# Patient Record
Sex: FEMALE | Race: WHITE | Employment: FULL TIME | URBAN - METROPOLITAN AREA
[De-identification: names, ages, dates, MRNs, and addresses within clinical notes are randomized per-mention and may not be internally consistent; named-entity substitution may affect disease eponyms.]

---
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
## 2024-02-28 DIAGNOSIS — F90.0 ATTENTION DEFICIT HYPERACTIVITY DISORDER (ADHD), PREDOMINANTLY INATTENTIVE TYPE: ICD-10-CM

## 2024-02-28 RX ORDER — DEXTROAMPHETAMINE SACCHARATE, AMPHETAMINE ASPARTATE, DEXTROAMPHETAMINE SULFATE AND AMPHETAMINE SULFATE 7.5; 7.5; 7.5; 7.5 MG/1; MG/1; MG/1; MG/1
30 TABLET ORAL DAILY
Qty: 30 TABLET | Refills: 0 | Status: SHIPPED | OUTPATIENT
Start: 2024-02-28 | End: 2024-03-29

## 2024-04-24 DIAGNOSIS — F90.0 ATTENTION DEFICIT HYPERACTIVITY DISORDER (ADHD), PREDOMINANTLY INATTENTIVE TYPE: ICD-10-CM

## 2024-04-24 RX ORDER — DEXTROAMPHETAMINE SACCHARATE, AMPHETAMINE ASPARTATE, DEXTROAMPHETAMINE SULFATE AND AMPHETAMINE SULFATE 7.5; 7.5; 7.5; 7.5 MG/1; MG/1; MG/1; MG/1
30 TABLET ORAL DAILY
Qty: 30 TABLET | Refills: 0 | Status: SHIPPED | OUTPATIENT
Start: 2024-04-24 | End: 2024-05-24

## 2024-07-25 DIAGNOSIS — F90.0 ATTENTION DEFICIT HYPERACTIVITY DISORDER (ADHD), PREDOMINANTLY INATTENTIVE TYPE: ICD-10-CM

## 2024-07-25 RX ORDER — DEXTROAMPHETAMINE SACCHARATE, AMPHETAMINE ASPARTATE, DEXTROAMPHETAMINE SULFATE AND AMPHETAMINE SULFATE 7.5; 7.5; 7.5; 7.5 MG/1; MG/1; MG/1; MG/1
30 TABLET ORAL DAILY
Qty: 30 TABLET | Refills: 0 | Status: SHIPPED | OUTPATIENT
Start: 2024-07-25 | End: 2024-08-24

## 2024-07-25 NOTE — TELEPHONE ENCOUNTER
Due for follow up with Dr Blackmon- please call them to schedule at their earliest convenience. (meds have been refilled this time)

## 2024-09-18 ENCOUNTER — TELEPHONE (OUTPATIENT)
Dept: FAMILY MEDICINE CLINIC | Age: 19
End: 2024-09-18

## 2024-09-18 DIAGNOSIS — F90.0 ATTENTION DEFICIT HYPERACTIVITY DISORDER (ADHD), PREDOMINANTLY INATTENTIVE TYPE: ICD-10-CM

## 2024-09-18 RX ORDER — DEXTROAMPHETAMINE SACCHARATE, AMPHETAMINE ASPARTATE, DEXTROAMPHETAMINE SULFATE AND AMPHETAMINE SULFATE 7.5; 7.5; 7.5; 7.5 MG/1; MG/1; MG/1; MG/1
30 TABLET ORAL DAILY
Qty: 30 TABLET | Refills: 0 | Status: SHIPPED | OUTPATIENT
Start: 2024-09-18 | End: 2024-10-18

## 2025-02-04 ENCOUNTER — OFFICE VISIT (OUTPATIENT)
Dept: FAMILY MEDICINE CLINIC | Age: 20
End: 2025-02-04

## 2025-02-04 VITALS
DIASTOLIC BLOOD PRESSURE: 70 MMHG | HEART RATE: 67 BPM | SYSTOLIC BLOOD PRESSURE: 110 MMHG | RESPIRATION RATE: 18 BRPM | OXYGEN SATURATION: 99 % | BODY MASS INDEX: 29.16 KG/M2 | WEIGHT: 203.2 LBS

## 2025-02-04 DIAGNOSIS — F90.0 ATTENTION DEFICIT HYPERACTIVITY DISORDER (ADHD), PREDOMINANTLY INATTENTIVE TYPE: Primary | ICD-10-CM

## 2025-02-04 DIAGNOSIS — N91.3 PRIMARY OLIGOMENORRHEA: ICD-10-CM

## 2025-02-04 LAB
ESTRADIOL SERPL-MCNC: 51 PG/ML
FSH SERPL-ACNC: 7.4 MIU/ML
LH SERPL-ACNC: 10.2 MIU/ML
PROGEST SERPL-MCNC: 0.22 NG/ML
PROLACTIN SERPL IA-MCNC: 9.7 NG/ML
TSH SERPL DL<=0.005 MIU/L-ACNC: 1.96 UIU/ML (ref 0.27–4.2)

## 2025-02-04 RX ORDER — DEXTROAMPHETAMINE SACCHARATE, AMPHETAMINE ASPARTATE, DEXTROAMPHETAMINE SULFATE AND AMPHETAMINE SULFATE 7.5; 7.5; 7.5; 7.5 MG/1; MG/1; MG/1; MG/1
30 TABLET ORAL DAILY
Qty: 30 TABLET | Refills: 0 | Status: SHIPPED | OUTPATIENT
Start: 2025-02-04 | End: 2025-03-06

## 2025-02-04 SDOH — ECONOMIC STABILITY: FOOD INSECURITY: WITHIN THE PAST 12 MONTHS, THE FOOD YOU BOUGHT JUST DIDN'T LAST AND YOU DIDN'T HAVE MONEY TO GET MORE.: NEVER TRUE

## 2025-02-04 SDOH — ECONOMIC STABILITY: FOOD INSECURITY: WITHIN THE PAST 12 MONTHS, YOU WORRIED THAT YOUR FOOD WOULD RUN OUT BEFORE YOU GOT MONEY TO BUY MORE.: NEVER TRUE

## 2025-02-04 ASSESSMENT — PATIENT HEALTH QUESTIONNAIRE - PHQ9
SUM OF ALL RESPONSES TO PHQ QUESTIONS 1-9: 0
1. LITTLE INTEREST OR PLEASURE IN DOING THINGS: NOT AT ALL
SUM OF ALL RESPONSES TO PHQ QUESTIONS 1-9: 0
SUM OF ALL RESPONSES TO PHQ QUESTIONS 1-9: 0
2. FEELING DOWN, DEPRESSED OR HOPELESS: NOT AT ALL
SUM OF ALL RESPONSES TO PHQ9 QUESTIONS 1 & 2: 0
SUM OF ALL RESPONSES TO PHQ QUESTIONS 1-9: 0

## 2025-02-04 NOTE — ASSESSMENT & PLAN NOTE
- this sounds like PCOS- with some hirsutism.  Begin eval with labs/sono.  - did not discuss treatment yet.  Will need progesterone to cycle her q4-6 wks.   - we did discuss that if we diagnose and successfully treat PCOS, her fertility could be affected (improved).  discussed FABM's for family planning which may be more effective than condoms, if done consistently.  Charting would also allow her to keep better track of her cycles and time meds, if warranted.      Orders:    DHEA-Sulfate; Future    Testosterone, free, total; Future    Androstenedione; Future    Progesterone; Future    Estradiol; Future    TSH reflex to FT4,FT3; Future    Prolactin; Future    Follicle Stimulating Hormone; Future    Luteinizing Hormone; Future    17-Hydroxyprogesterone; Future    US NON OB TRANSVAGINAL; Future

## 2025-02-04 NOTE — PROGRESS NOTES
2025    Blood pressure 110/70, pulse 67, resp. rate 18, weight 92.2 kg (203 lb 3.2 oz), last menstrual period 10/19/2024, SpO2 99%.    Minnie Adams (:  2005) is a 19 y.o. female, here for evaluation of the following medical concerns:    Chief Complaint   Patient presents with    Follow-up    Medication Refill     Follow up on ADHD medication.  No new concerns.  Patient would like Flu Vaccine. Informed she needs to go to her pharmacy.     Recently  to [facility].    Lives in [place].  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  She was without ADHD meds and feels this was the cause.  Looking for new work.    Was using adderall 30.  \"One pill lasts the whole day\"  No SE's when she was taking it (mild reduced appetite, weight stable.)  Would like to restart meds.  Helps with focus and prevent mistakes.  Better info recall and instruction retention.    Is not seeking pregnancy.    No hx mood issues.    GYN care:  no prior pap, is not 20. No sti testing before. Family planning with [facility], condoms.    Irreg cycles: all life. Longest: 1 yr without menses.  LMP: 10/2024- and was light.  No acne  + shaves facial hair- chin  (only a few).      Patient Active Problem List   Diagnosis    Dog bite of arm, left, initial encounter    Attention deficit hyperactivity disorder (ADHD), predominantly inattentive type        Body mass index is 29.16 kg/m².    Wt Readings from Last 3 Encounters:   25 92.2 kg (203 lb 3.2 oz)   23 89.4 kg (197 lb) (97%, Z= 1.90)*   23 90.7 kg (200 lb) (97%, Z= 1.95)*     * Growth percentiles are based on CDC (Girls, 2-20 Years) data.       BP Readings from Last 3 Encounters:   25 110/70   23 104/64   23 111/75       No Known Allergies    Prior to Visit Medications    Medication Sig Taking? Authorizing Provider   melatonin 3 MG TABS tablet Take 1 tablet by mouth daily Yes Provider, Historical,

## 2025-02-06 LAB
DHEA-S SERPL-MCNC: 284 UG/DL (ref 148–407)
SHBG SERPL-SCNC: 26 NMOL/L (ref 25–122)
TESTOST FREE SERPL-MCNC: 17.7 PG/ML (ref 0.8–7.4)
TESTOST SERPL-MCNC: 85 NG/DL (ref 8–48)

## 2025-02-07 LAB — 17OHP SERPL-MCNC: 145.19 NG/DL

## 2025-02-08 LAB — ANDROST SERPL-MCNC: 2.76 NG/ML (ref 0.26–2.14)

## 2025-02-10 RX ORDER — MEDROXYPROGESTERONE ACETATE 10 MG
10 TABLET ORAL DAILY
Qty: 10 TABLET | Refills: 0 | Status: SHIPPED | OUTPATIENT
Start: 2025-02-10 | End: 2025-02-20